# Patient Record
Sex: FEMALE | Race: WHITE
[De-identification: names, ages, dates, MRNs, and addresses within clinical notes are randomized per-mention and may not be internally consistent; named-entity substitution may affect disease eponyms.]

---

## 2017-10-18 NOTE — OR
SURGEON:

NGUYEN ALANIS MD

 

DATE OF PROCEDURE:  10/18/2017

 

PREOPERATIVE DIAGNOSIS:

History of colon polyps.

 

POSTOPERATIVE DIAGNOSIS:

Diverticulosis.

 

PROCEDURE PERFORMED:

Screening colonoscopy.

 

INSTRUMENT USED:

Olympus colonoscope.

 

ANESTHESIA:

MAC.

 

EXTENT OF EXAM:

To the cecum.

 

PREPARATION:

Good.

 

LIMITATIONS:

None.

 

INDICATIONS:

The patient is a 66-year-old female, who has a history of colon polyps.  She is

due for repeat scope.  We discussed the procedure as well as expected

perioperative course.  We discussed the risks, including bleeding, infection, or

damage to the surrounding structures including perforation.  The patient

verbalized understanding and wishes to proceed.

 

PROCEDURE IN DETAIL:

The patient was brought in the Endoscopy Suite and placed in the left lateral

decubitus position.  A time-out was completed verifying the patient's name, age,

date of birth, allergies, and procedure to be performed.  Monitored anesthesia

care was induced, and continuous oxygen was provided via nasal cannula

throughout the procedure.  After adequate sedation was achieved, a digital

rectal exam was performed.  This examination was within normal limits.  A well-

lubricated colonoscope was inserted in the rectum and advanced under direct

visualization to the level of the cecum.  The cecum was identified by both

visual and anatomic landmarks.  Picture was taken of the cecal cap, but I was

unable to retroflex the scope within the cecum.  The scope was then fully

withdrawn while examining the color, texture, anatomy, and integrity of the

mucosa from the cecum to the anal canal.  Findings were consistent with

diverticulosis.  The remainder of the colonic mucosa appeared normal.  The scope

was brought into the rectum and retroflexed to allow visualization of the anal

canal opening.  This appeared normal and a photograph was taken.  The scope was

straightened out and removed from the patient.  The cecum to anus time was 6

minutes.  The patient was transferred to the recovery room in stable condition.

 

ENDOSCOPIC DIAGNOSIS:

Diverticulosis.

 

RECOMMENDATIONS:

Follow up in clinic in 2 weeks.

 

 

SHAWANDA CARRILLO

DD:  10/18/2017 19:15:20

DT:  10/18/2017 23:30:28

Job #:  [id number]

## 2017-10-18 NOTE — PCM.PREANE
Preanesthetic Assessment





- Anesthesia/Transfusion/Family Hx


Anesthesia History: Prior Anesthesia Without Reaction


Family History of Anesthesia Reaction: No


Transfusion History: No Prior Transfusion(s)


Intubation History: Unknown





- Review of Systems


General: No Symptoms


Pulmonary: No Symptoms


Cardiovascular: No Symptoms


Gastrointestinal: No Symptoms, Other (h/o colon polyps, multiple colonoscopies)


Neurological: No Symptoms


Other: Reports: None





- Physical Assessment


NPO Status Date: 10/17/17


NPO Status Time: 18:00


O2 Sat by Pulse Oximetry: 95


Respiratory Rate: 16


Vital Signs: 





 Last Vital Signs











Temp  36.6 C   10/18/17 12:19


 


Pulse  65   10/18/17 12:19


 


Resp  16   10/18/17 12:19


 


BP  115/79   10/18/17 12:19


 


Pulse Ox  95   10/18/17 12:19











Height: 1.65 m


Weight: 75.75 kg


ASA Class: 2


Mental Status: Alert & Oriented x3


Airway Class: Mallampati = 2


Dentition: Reports: Partial (upper)


Thyro-Mental Finger Breadths: 3


Mouth Opening Finger Breadths: 2


ROM/Head Extension: Full


Lungs: Clear to Auscultation, Normal Respiratory Effort


Cardiovascular: Regular Rate, Regular Rhythm





- Allergies


Allergies/Adverse Reactions: 


 Allergies











Allergy/AdvReac Type Severity Reaction Status Date / Time


 


adhesive tape Allergy  Rash Verified 10/16/17 17:02














- Anesthesia Plan


Pre-Op Medication Ordered: None





- Acknowledgements


Anesthesia Type Planned: MAC


Pt an Appropriate Candidate for the Planned Anesthesia: Yes


Alternatives and Risks of Anesthesia Discussed w Pt/Guardian: Yes


Pt/Guardian Understands and Agrees with Anesthesia Plan: Yes





PreAnesthesia Questionnaire


HEENT History: Reports: Cataract, Macular Degeneration


Other HEENT History: wears reading glasses, has upper removable partial denture


Cardiovascular History: Reports: High Cholesterol


Other Cardiovascular History: denies hypertension, states takes Lasix and HCTZ 

for water retention


Gastrointestinal History: Reports: Colon Polyp, GERD, Hiatal Hernia


OB/GYN History: Reports: Pregnancy


Musculoskeletal History: Reports: Arthritis


Psychiatric History: Reports: Anxiety


Endocrine/Metabolic History: Reports: Hypothyroidism





- Past Surgical History


HEENT Surgical History: Reports: Cataract Surgery


GI Surgical History: Reports: Colonoscopy


Female  Surgical History: Reports: Tubal Ligation


Neurological Surgical History: Reports: Laminectomy





- SUBSTANCE USE


Smoking Status *Q: Former Smoker


Tobacco Use Within Last Twelve Months: No


Recreational Drug Use History: No





- HOME MEDS


Home Medications: 


 Home Meds





Aspirin [Adult Low Dose Aspirin EC] 81 mg PO DAILY 10/16/17 [History]


Calcium Carbonate/Vitamin D3 [Calcium 600 + Vit D 200] 1 cap PO DAILY 10/16/17 [

History]


Fenofibrate Nanocrystallized [Fenofibrate] 145 mg PO DAILY 10/16/17 [History]


Ferrous Sulfate 1 tab PO DAILY 10/16/17 [History]


Fish Oil/DHA/EPA [Fish Oil 1,200 MG] 1,200 mg PO DAILY 10/16/17 [History]


Fluticasone Propionate [Flonase] 1 spray NASBOTH DAILY PRN 10/16/17 [History]


Furosemide [Lasix] 20 mg PO DAILY 10/16/17 [History]


Hydrochlorothiazide 25 mg PO DAILY 10/16/17 [History]


Inulin/Chromium Picolinate [Fiber Gummies] 2 tab PO DAILY 10/16/17 [History]


Levothyroxine Sodium 88 mg PO DAILY 10/16/17 [History]


Loratadine [Claritin] 10 mg PO DAILY 10/16/17 [History]


Meloxicam 15 mg PO DAILY 10/16/17 [History]


Multivitamin [Multi-Vitamin Daily] 1 tab PO DAILY 10/16/17 [History]


Omeprazole 40 mg PO DAILY 10/16/17 [History]


Potassium Chloride 10 meq PO DAILY 10/16/17 [History]


Prelief 1 cap PO DAILY 10/16/17 [History]


Vit A/Vit C/Vit E/Zinc/Copper [Preservision Areds Softgel] 1 cap PO DAILY 10/16/

17 [History]


Vitamin E 400 mg PO DAILY 10/16/17 [History]











- CURRENT (IN HOUSE) MEDS


Current Meds: 





 Current Medications





Lactated Ringer's (Ringers, Lactated)  1,000 mls @ 125 mls/hr IV ASDIRECTED PADMAJA


   Last Admin: 10/18/17 12:22 Dose:  125 mls/hr


Sodium Chloride (Saline Flush)  10 ml FLUSH ASDIRECTED PRN


   PRN Reason: Keep Vein Open


Sodium Chloride (Saline Flush)  2.5 ml FLUSH ASDIRECTED PRN


   PRN Reason: Keep Vein Open

## 2017-10-18 NOTE — PCM.POSTAN
POST ANESTHESIA ASSESSMENT





- MENTAL STATUS


Mental Status: Alert, Oriented





- RESPIRATORY


Respiratory Status: Respiratory Rate WNL, Airway Patent, O2 Saturation Stable





- CARDIOVASCULAR


CV Status: Pulse Rate WNL, Blood Pressure Stable





- GASTROINTESTINAL


GI Status: No Symptoms





- PAIN


Pain Score: 0





- POST OP HYDRATION


Hydration Status: Adequate & Stable

## 2017-10-18 NOTE — [id number]
Post Anesthesia Note





- EVALUATION WITHIN 48HRS OF ANESTHETIC


Vital Signs in Normal Range: Yes


Patient Participated in Evaluation: Yes


Respiratory Function Stable: Yes


Airway Patent: Yes


Cardiovascular Function Stable: Yes


Hydration Status Stable: Yes


Pain Control Satisfactory: Yes


Nausea and Vomiting Control Satisfactory: Yes


Mental Status Recovered: Yes





- COMMENTS/OBSERVATIONS


Free Text/Narrative:: 





Walked out accompanied.

## 2017-10-18 NOTE — PCM.OPNOTE
- General Post-Op/Procedure Note


Date of Surgery/Procedure: 10/18/17


Operative Procedure(s): Colonoscopy


Findings: 


Diverticulosis 


Pre Op Diagnosis: Hx of colon polyps


Post-Op Diagnosis: Diverticulosis


Anesthesia Technique: MAC


Primary Surgeon: Trinh Mullins


Condition: Good

## 2022-12-15 ENCOUNTER — HOSPITAL ENCOUNTER (OUTPATIENT)
Dept: HOSPITAL 56 - MW.SDS | Age: 72
Discharge: HOME | End: 2022-12-15
Attending: SURGERY
Payer: MEDICARE

## 2022-12-15 DIAGNOSIS — Z91.048: ICD-10-CM

## 2022-12-15 DIAGNOSIS — K21.9: ICD-10-CM

## 2022-12-15 DIAGNOSIS — Z12.11: Primary | ICD-10-CM

## 2022-12-15 DIAGNOSIS — E78.00: ICD-10-CM

## 2022-12-15 DIAGNOSIS — Z86.010: ICD-10-CM

## 2022-12-15 DIAGNOSIS — Z98.890: ICD-10-CM

## 2022-12-15 DIAGNOSIS — Z79.899: ICD-10-CM

## 2022-12-15 DIAGNOSIS — F41.9: ICD-10-CM

## 2022-12-15 DIAGNOSIS — M19.90: ICD-10-CM

## 2022-12-15 DIAGNOSIS — Z87.19: ICD-10-CM

## 2022-12-15 DIAGNOSIS — E03.9: ICD-10-CM

## 2022-12-15 DIAGNOSIS — D12.5: ICD-10-CM

## 2022-12-15 DIAGNOSIS — Z79.82: ICD-10-CM

## 2022-12-15 DIAGNOSIS — K57.30: ICD-10-CM

## 2022-12-15 DIAGNOSIS — J30.9: ICD-10-CM

## 2022-12-15 DIAGNOSIS — Z79.890: ICD-10-CM

## 2022-12-15 PROCEDURE — 45380 COLONOSCOPY AND BIOPSY: CPT
